# Patient Record
Sex: FEMALE | Race: ASIAN | NOT HISPANIC OR LATINO
[De-identification: names, ages, dates, MRNs, and addresses within clinical notes are randomized per-mention and may not be internally consistent; named-entity substitution may affect disease eponyms.]

---
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
## 2021-06-01 VITALS — WEIGHT: 185 LBS

## 2021-06-01 VITALS — BODY MASS INDEX: 36.13 KG/M2 | WEIGHT: 196 LBS | HEIGHT: 60 IN

## 2021-06-19 NOTE — PROGRESS NOTES
Subjective:      Patient ID: Deyanira Ramos is a 18 y.o. female.    Chief Complaint:    HPI Deyanira Ramos is a 18 y.o. female who presents today complaining of bilateral eye itching x 1 day.  Patient is allergic to dust, mold, ragweed, and cat dander.  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  After cleaning cages she noticed a lot of swelling on her conjunctiva.  She states that it looked like a blister.  She went to the emergency room department because she had never seen anything like it, but she did not get seen by a provider.  She took her allergy medications over-the-counter, and the swelling went down.  She is still having irritation and redness of the conjunctiva.  She denies any vision changes, discharge, or eye pain.      Social History   Substance Use Topics     Smoking status: Never Smoker     Smokeless tobacco: Never Used     Alcohol use None       Review of Systems   Constitutional: Negative for fever.   Eyes: Positive for redness and itching. Negative for pain, discharge and visual disturbance.   Allergic/Immunologic: Positive for environmental allergies.       Objective:     /64  Pulse 60  Temp 97.7  F (36.5  C) (Oral)   Resp 14  Wt 196 lb (88.9 kg)  LMP 06/27/2018  SpO2 99%  BMI 38.28 kg/m2    Physical Exam   Constitutional: She appears well-developed and well-nourished. No distress.   HENT:   Head: Normocephalic and atraumatic.   Right Ear: External ear normal.   Left Ear: External ear normal.   Eyes: EOM and lids are normal. Pupils are equal, round, and reactive to light. Right eye exhibits no discharge. Left eye exhibits no discharge. Right conjunctiva is injected. Right conjunctiva has no hemorrhage. Left conjunctiva is injected. Left conjunctiva has no hemorrhage.   Pulmonary/Chest: Effort normal. No respiratory distress.   Skin: She is not diaphoretic.   Psychiatric: She has a normal mood and affect. Her behavior is normal. Judgment and thought content normal.    Nursing note and vitals reviewed.      Assessment:     Procedures  1. Allergic conjunctivitis, bilateral  olopatadine (PATANOL) 0.1 % ophthalmic solution         Patient Instructions   1.  Continue using your normal allergy medications as you have been.  2.  Start taking antihistamine eyedrops for irritation twice daily the next 5 days.  3.  Attempt to avoid further irritation by wearing goggles when you are going to be cleaning animal cages or dusting.   4.  Seek emergency medical attention if you develop severe eye pain, vision changes, or difficulty moving her eyes in every direction.